# Patient Record
Sex: MALE | Race: WHITE | Employment: OTHER | URBAN - METROPOLITAN AREA
[De-identification: names, ages, dates, MRNs, and addresses within clinical notes are randomized per-mention and may not be internally consistent; named-entity substitution may affect disease eponyms.]

---

## 2017-02-28 NOTE — PATIENT DISCUSSION
Dermatochalasis Counseling:  I have examined the patient and reviewed  the photos and visual fields. The excess upper eyelid tissue folds downward towards or onto the  lid margin causing impairment of the peripheral visual field. The visual field improves by lifting this tissue which is supported by evidence of taped visual fields. I have discussed with the patient the option of blepharoplasty surgery to improve the functional visual field. We have discussed the risks and benefits of the surgery at length as well as the location of the incision and the recovery process. The patient understands this discussion, has had all questions answered and desires to proceed with blepharoplasty surgery as explained.

## 2017-02-28 NOTE — PATIENT DISCUSSION
PHOTOGRAPHS: I have reviewed the external ocular photographs of this patient which show the following: significant dermatochalasis both upper eyelids.

## 2017-02-28 NOTE — PATIENT DISCUSSION
[place] Visual Field 36 point screen: I have reviewed the visual fields both taped and untaped on this patient which demonstrate significant obstruction of the patient's peripheral visual field on both eyes.

## 2017-04-25 NOTE — PATIENT DISCUSSION
You are now at the one to two week postoperative point from your surgery. You may have residual bruising and swelling, which is to be expected at this time. We recommend stopping cold compresses.

## 2017-04-25 NOTE — PATIENT DISCUSSION
Patient is here for suture removal.  The patient has a normal amount of brusing and swelling consistent with the post operative course. The suture lines are intact, there is no evidence of infection. The plan is to remove the sutures today with an expectation of normal healing.   The post op course has been further reviewed with the patient

## 2017-04-25 NOTE — PATIENT DISCUSSION
You do not need to be on any antibiotics at this time if you have finished your oral antibiotics given to you at the time of your surgery.

## 2017-04-25 NOTE — PATIENT DISCUSSION
Please start to use a hot compress two to three times daily for five to ten minutes each time. This involves using a clean washcloth and placing it under hot tap water. Squeeze out the washcloth until it is moist and hot and place it over the surgical area for five to 10 minutes. The washcloth may cool off during this period and you will need to repeatedly place it under the hot water. I would like you to do this for at least one month. This will help reduce further swelling and bruising.

## 2017-04-25 NOTE — PATIENT DISCUSSION
Resume normal activity. Resume any medications that were discontinued for surgery. Stop cold compresses Hot compresses to affected lid(s) 2-3 times daily for one month, 5 minutes at a time. Artificial tears prn burning/itching/blurry vision. Wash incisions/ lash line once daily with baby shampoo.

## 2017-04-25 NOTE — PATIENT DISCUSSION
Also, please do not hesitate to call us if you have any concerns not addressed by this information. Please call [phone] and we will do everything we can to help you during this period.

## 2017-06-06 NOTE — PATIENT DISCUSSION
AMD (DRY), OU: CONTINUE AREDS 2 VITAMINS / AMSLER GRID QD/ UV PROTECTION. SMOKING CESSATION EMPHASIZED. BASELINE FUNDUS PHOTO TAKEN TODAY. RETURN FOR FOLLOW-UP AS SCHEDULED.

## 2017-12-05 NOTE — PATIENT DISCUSSION
POSTERIOR CAPSULAR FIBROSIS, OU- VISUALLY SIGNIFICANT. SCHEDULE YAG CAPSULOTOMY  IF VISUAL SYMPTOMS PERSIST.

## 2017-12-05 NOTE — PATIENT DISCUSSION
EXPOSURE KERATOCONJUNCTIVITIS : PRESCRIBED PRESERVATIVE FREE ARTIFICIAL TEARS BID ? QID4-6X A DAY, OU AND THE DAILY INTAKE OF OMEGA-3 DHA/EPA FATTY ACIDS TO HELP RELIEVE SYMPTOMS. ADD NIGHTLY LUBRICATING OINTMENT OR GEL. WILL CONSIDER RESTASIS OR PUNCTAL PLUGS AND/OR LIPIFLOW TREATMENT NEXT VISIT IF NOT RESPONSIVE OR IF SYMPTOMS PERSIST. RETURN FOR FOLLOW-UP AS SCHEDULED OR SOONER IF SYMPTOMS WORSEN.

## 2017-12-05 NOTE — PATIENT DISCUSSION
Continue: PreserVision AREDS 2 (vit c,v-yu-zbjdv-lutein-zeaxan): capsule: 823-345-53-4 mg-unit-mg-mg 1 capsule twice a day by mouth 08-

## 2018-07-17 NOTE — PATIENT DISCUSSION
AMD (DRY), OU: PROGRESSING. CONTINUE AREDS 2 VITAMINS / AMSLER GRID QD/ UV PROTECTION. SMOKING CESSATION EMPHASIZED. RETURN FOR FOLLOW-UP AS SCHEDULED.

## 2018-11-20 ENCOUNTER — ESTABLISHED COMPREHENSIVE EXAM (OUTPATIENT)
Dept: URBAN - METROPOLITAN AREA CLINIC 35 | Facility: CLINIC | Age: 53
End: 2018-11-20

## 2018-11-20 DIAGNOSIS — H52.4: ICD-10-CM

## 2018-11-20 DIAGNOSIS — H52.03: ICD-10-CM

## 2018-11-20 PROCEDURE — 92015 DETERMINE REFRACTIVE STATE: CPT

## 2018-11-20 PROCEDURE — 92014 COMPRE OPH EXAM EST PT 1/>: CPT

## 2018-11-20 ASSESSMENT — KERATOMETRY
OS_K2POWER_DIOPTERS: 44.75
OS_K1POWER_DIOPTERS: 44.500
OD_K2POWER_DIOPTERS: 45.00
OD_K1POWER_DIOPTERS: 44.75

## 2018-11-20 ASSESSMENT — VISUAL ACUITY
OS_CC: 20/25-1
OD_CC: J1+
OD_SC: 20/50
OS_SC: 20/40-1
OD_CC: 20/25-1
OS_CC: J1+

## 2018-11-20 ASSESSMENT — TONOMETRY
OS_IOP_MMHG: 10
OD_IOP_MMHG: 10

## 2019-01-15 NOTE — PATIENT DISCUSSION
EXPOSURE KERATOCONJUNCTIVITIS : CONTINUE PRESERVATIVE FREE ARTIFICIAL TEARS BID ? QID4-6X A DAY, OU AND THE DAILY INTAKE OF OMEGA-3 DHA/EPA FATTY ACIDS TO HELP RELIEVE SYMPTOMS. ADD NIGHTLY LUBRICATING OINTMENT OR GEL. WILL CONSIDER RESTASIS OR PUNCTAL PLUGS AND/OR LIPIFLOW TREATMENT NEXT VISIT IF NOT RESPONSIVE OR IF SYMPTOMS PERSIST. RETURN FOR FOLLOW-UP AS SCHEDULED OR SOONER IF SYMPTOMS WORSEN.

## 2019-01-15 NOTE — PATIENT DISCUSSION
Continue: PreserVision AREDS 2 (vit c,d-vu-nijdg-lutein-zeaxan): capsule: 682-334-11-8 mg-unit-mg-mg 1 capsule twice a day by mouth 07-

## 2019-01-30 NOTE — PATIENT DISCUSSION
"Use oil based lubricant drops like Systane Balance 6 times a day and 1/4"" lubricant ointment in eye like Refresh PM or Systane Nighttime at bedtime or more as needed. "

## 2019-07-16 NOTE — PATIENT DISCUSSION
EXPOSURE KERATOCONJUNCTIVITIS : IMPROVED. CONTINUE PRESERVATIVE FREE ARTIFICIAL TEARS AND  NIGHTLY LUBRICATING OINTMENT OR GEL.

## 2019-07-16 NOTE — PATIENT DISCUSSION
Stopped Today: PreserVision AREDS 2 (vit c,p-mz-zawjy-lutein-zeaxan): capsule: 037-022-33-6 mg-unit-mg-mg 1 capsule twice a day by mouth 07-

## 2019-07-16 NOTE — PATIENT DISCUSSION
AMD (DRY), OU WITH GA: PROGRESSING. CONTINUE AREDS 2 VITAMINS / AMSLER GRID QD/ UV PROTECTION. SMOKING CESSATION EMPHASIZED. RETURN FOR FOLLOW-UP AS SCHEDULED.

## 2019-07-16 NOTE — PATIENT DISCUSSION
New Prescription: PreserVision AREDS 2 (vit c,q-lq-jnupv-lutein-zeaxan): capsule: 342-793-22-2 mg-unit-mg-mg 1 capsule twice a day as directed by mouth 07-

## 2022-06-16 ENCOUNTER — COMPREHENSIVE EXAM (OUTPATIENT)
Dept: URBAN - METROPOLITAN AREA CLINIC 35 | Facility: CLINIC | Age: 57
End: 2022-06-16

## 2022-06-16 DIAGNOSIS — H52.03: ICD-10-CM

## 2022-06-16 PROCEDURE — 92014 COMPRE OPH EXAM EST PT 1/>: CPT

## 2022-06-16 PROCEDURE — 92015 DETERMINE REFRACTIVE STATE: CPT

## 2022-06-16 ASSESSMENT — VISUAL ACUITY
OS_CC: J8
OD_CC: 20/30
OD_CC: J8
OS_CC: 20/40-1
OU_CC: 20/30-1
OU_CC: J6

## 2022-06-16 ASSESSMENT — KERATOMETRY
OS_K1POWER_DIOPTERS: 44.500
OS_K2POWER_DIOPTERS: 44.75
OD_K1POWER_DIOPTERS: 44.75
OD_K2POWER_DIOPTERS: 45.00

## 2022-06-16 ASSESSMENT — TONOMETRY
OD_IOP_MMHG: 16
OS_IOP_MMHG: 16